# Patient Record
Sex: MALE | Race: WHITE | NOT HISPANIC OR LATINO | URBAN - NONMETROPOLITAN AREA
[De-identification: names, ages, dates, MRNs, and addresses within clinical notes are randomized per-mention and may not be internally consistent; named-entity substitution may affect disease eponyms.]

---

## 2018-07-05 PROBLEM — H35.3131: Noted: 2018-07-05

## 2018-07-05 PROBLEM — H02.834: Noted: 2018-07-05

## 2018-07-05 PROBLEM — H35.379: Noted: 2018-06-26

## 2018-07-05 PROBLEM — H43.813: Noted: 2018-06-26

## 2018-07-05 PROBLEM — H25.813: Noted: 2019-04-22

## 2018-07-05 PROBLEM — H25.813: Noted: 2018-06-26

## 2018-07-05 PROBLEM — H02.831: Noted: 2018-07-05

## 2018-07-05 PROBLEM — H35.379: Noted: 2019-04-22

## 2018-07-05 PROBLEM — H35.3131: Noted: 2019-04-22

## 2019-01-07 ENCOUNTER — IMPORTED ENCOUNTER (OUTPATIENT)
Dept: URBAN - NONMETROPOLITAN AREA CLINIC 1 | Facility: CLINIC | Age: 69
End: 2019-01-07

## 2019-01-07 PROCEDURE — 92014 COMPRE OPH EXAM EST PT 1/>: CPT

## 2019-01-07 PROCEDURE — 92134 CPTRZ OPH DX IMG PST SGM RTA: CPT

## 2019-01-07 NOTE — PATIENT DISCUSSION
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 -Recommend pt begin AREDS 2 MVT and monitoring Amsler grid.-Amsler grid given and explained to pt. Recommend monitoring daily. Pt is to contact our office if any changes are noted. Mild chance of vision loss. -OCT MAC ordered performed and reviewed with the patient. See scan for interpretation.-Order OCT MAC next visitMild ERM OUDiscussed with ptContinue to monitorAmlser Grid daily-OCT MAC ordered performed and reviewed with the patient. See scan for interpretation.-Order OCT MAC next visitCataract OU-Reviewed symptoms of advancing cataract growth such as glare and halos and decreased vision.-Continue to monitor for now. Pt will notify us if any new symptoms develop. Dermo L>RERM OS onlyOld OD cornea scar Central Vitreous floatersOURD Precautions RTC: 6mos f/u ARMD & OCT MAC

## 2019-04-22 ENCOUNTER — IMPORTED ENCOUNTER (OUTPATIENT)
Dept: URBAN - NONMETROPOLITAN AREA CLINIC 1 | Facility: CLINIC | Age: 69
End: 2019-04-22

## 2019-04-22 PROCEDURE — 92014 COMPRE OPH EXAM EST PT 1/>: CPT

## 2019-04-22 NOTE — PATIENT DISCUSSION
Cataract(s)-Visually significant cataract OU.-Cataract(s) causing symptomatic impairment of visual function not correctable with a tolerable change in glasses or contact lenses lighting or non-operative means resulting in specific activity limitations and/or participation restrictions including but not limited to reading viewing television driving or meeting vocational or recreational needs. -Expectation is clearer vision and functional improvement in symptoms as well as reduced glare disability after cataract removal.-Order IOLMaster and OPD today. -Recommend Toric IOL/LenSx OU based on today's OPD testing and lifestyle questionnaire.-Discussed LenSx in depth w/ patient.-All questions were answered regarding surgery including pre and post-op medications appointments activity restrictions and anesthetic usage.-The risks benefits and alternatives and special risk factors for the patient were discussed in detail including but not limited to: bleeding infection retinal detachment vitreous loss problems with the implant and possible need for additional surgery.-Although rare the possibility of complete vision loss was discussed.-The possible need for glasses post-operatively was discussed.-Order medical clearance exam based on history of -Patient elects to proceed with cataract surgery OD. Will schedule at patient's convenience and re-evaluate OS  in the futureERM OS>OD-Explained ERM w/ patient and advised that there are currently no treatments available. -Recommend pt begin AREDS 2 MVT and monitoring Amsler grid.-Amsler grid given and explained to pt. Recommend monitoring daily. Pt is to contact our office if any changes are noted.

## 2019-05-13 PROBLEM — H25.813: Noted: 2019-05-13

## 2019-05-13 PROBLEM — H35.3131: Noted: 2019-05-13

## 2019-05-13 PROBLEM — H35.379: Noted: 2019-05-13

## 2019-05-13 PROBLEM — H43.813: Noted: 2019-05-13

## 2019-05-21 ENCOUNTER — IMPORTED ENCOUNTER (OUTPATIENT)
Dept: URBAN - NONMETROPOLITAN AREA CLINIC 1 | Facility: CLINIC | Age: 69
End: 2019-05-21

## 2019-06-10 ENCOUNTER — IMPORTED ENCOUNTER (OUTPATIENT)
Dept: URBAN - NONMETROPOLITAN AREA CLINIC 1 | Facility: CLINIC | Age: 69
End: 2019-06-10

## 2019-06-10 PROBLEM — H02.831: Noted: 2019-06-10

## 2019-06-10 PROBLEM — H43.813: Noted: 2019-06-10

## 2019-06-10 PROBLEM — H02.834: Noted: 2019-06-10

## 2019-06-10 PROBLEM — H25.812: Noted: 2019-06-10

## 2019-06-10 PROBLEM — H35.379: Noted: 2019-06-10

## 2019-06-10 PROBLEM — H35.3131: Noted: 2019-06-10

## 2019-06-10 PROBLEM — Z98.41: Noted: 2019-06-10

## 2019-06-10 PROCEDURE — 66984 XCAPSL CTRC RMVL W/O ECP: CPT

## 2019-06-10 PROCEDURE — V2787 ASTIGMATISM-CORRECT FUNCTION: HCPCS

## 2019-06-10 PROCEDURE — 92136 OPHTHALMIC BIOMETRY: CPT

## 2019-06-10 NOTE — PATIENT DISCUSSION
s/p CE OD TORIC/TRAD 6/10/2019-  Pt doing well s/p PCIOL. -  Continue post-op gtts according to instruction sheet and sleep with eye shield over eye for 7 nights. -  Avoid bending at the waist lifting anything over 5lbs and dirty or cher environments.-  RTC 1 week as scheduled or prn

## 2019-06-17 ENCOUNTER — IMPORTED ENCOUNTER (OUTPATIENT)
Dept: URBAN - NONMETROPOLITAN AREA CLINIC 1 | Facility: CLINIC | Age: 69
End: 2019-06-17

## 2019-06-17 PROCEDURE — 99024 POSTOP FOLLOW-UP VISIT: CPT

## 2019-06-17 NOTE — PATIENT DISCUSSION
Cataract(s)-Visually significant cataract OS . -Cataract(s) causing symptomatic impairment of visual function not correctable with a tolerable change in glasses or contact lenses lighting or non-operative means resulting in specific activity limitations and/or participation restrictions including but not limited to reading viewing television driving or meeting vocational or recreational needs. -Expectation is clearer vision and functional improvement in symptoms as well as reduced glare disability after cataract removal.-Recommend Toric IOL   /   Limbal Relaxing Incisions based on previous OPD testing and lifestyle questionnaire.-All questions were answered regarding surgery including pre and post-op medications appointments activity restrictions and anesthetic usage.-The risks benefits and alternatives and special risk factors for the patient were discussed in detail including but not limited to: bleeding infection retinal detachment vitreous loss problems with the implant and possible need for additional surgery.-Although rare the possibility of complete vision loss was discussed.-The need for glasses post-operatively was discussed.-Patient elects to proceed with cataract surgery OS . Will schedule at patient's convenience. s/p PCIOL-Pt doing well at 1 week s/p PCIOL. -Continue post-op gtts according to instruction sheet.-Okay to resume usual activites and d/c eye shield.

## 2019-06-26 ENCOUNTER — IMPORTED ENCOUNTER (OUTPATIENT)
Dept: URBAN - NONMETROPOLITAN AREA CLINIC 1 | Facility: CLINIC | Age: 69
End: 2019-06-26

## 2019-06-26 PROBLEM — H43.813: Noted: 2019-06-10

## 2019-06-26 PROBLEM — H35.3131: Noted: 2019-06-10

## 2019-06-26 PROBLEM — H02.834: Noted: 2019-06-10

## 2019-06-26 PROBLEM — Z98.41: Noted: 2019-06-26

## 2019-06-26 PROBLEM — H35.379: Noted: 2019-06-10

## 2019-06-26 PROBLEM — H02.831: Noted: 2019-06-10

## 2019-06-26 PROBLEM — Z98.42: Noted: 2019-06-26

## 2019-06-26 PROCEDURE — V2787 ASTIGMATISM-CORRECT FUNCTION: HCPCS

## 2019-06-26 PROCEDURE — 66984 XCAPSL CTRC RMVL W/O ECP: CPT

## 2019-06-26 PROCEDURE — 92136 OPHTHALMIC BIOMETRY: CPT

## 2019-06-26 NOTE — PATIENT DISCUSSION
s/p PCIOL OS Toric/Trad 6/26/2019-  discussed findings w/patient-  Pt doing well s/p PCIOL. -  Continue post-op gtts according to instruction sheet and sleep with eye shield over eye for 7 nights. -  Avoid bending at the waist lifting anything over 5lbs and dirty or cher environments.-  RTC 1 week as scheduled

## 2019-07-03 ENCOUNTER — IMPORTED ENCOUNTER (OUTPATIENT)
Dept: URBAN - NONMETROPOLITAN AREA CLINIC 1 | Facility: CLINIC | Age: 69
End: 2019-07-03

## 2019-07-03 PROCEDURE — 99024 POSTOP FOLLOW-UP VISIT: CPT

## 2019-07-03 NOTE — PATIENT DISCUSSION
"s/p PCIOL OS Toric/Trad 6/26/2019-  discussed findings w/patient""-  Pt doing well at 1 week s/p PCIOL. -  Continue post-op gtts according to instruction sheet.-  Okay to resume usual activites and d/c eye shield. -  Monitor 3 month f/u Pseudophakia OU w/DFE"

## 2019-10-02 ENCOUNTER — IMPORTED ENCOUNTER (OUTPATIENT)
Dept: URBAN - NONMETROPOLITAN AREA CLINIC 1 | Facility: CLINIC | Age: 69
End: 2019-10-02

## 2019-10-02 PROBLEM — H43.813: Noted: 2019-10-02

## 2019-10-02 PROBLEM — H02.831: Noted: 2019-10-02

## 2019-10-02 PROBLEM — Z96.1: Noted: 2019-10-02

## 2019-10-02 PROBLEM — H02.834: Noted: 2019-10-02

## 2019-10-02 PROCEDURE — 99213 OFFICE O/P EST LOW 20 MIN: CPT

## 2019-10-02 NOTE — PATIENT DISCUSSION
Pseudophakia OU PCO OU:  -  discussed findings w/patient-  doing well happy with [facility]-  IOP in place well centered-  PCO noted but not visually significant at this time-  Monitor yearly or PRN PVD OU-  Discussed findings of exam in detail with the patient. -  The risk of retinal detachment in patients with PVDs was discussed with the patient and the warning signs of retinal detachment were carefully reviewed with the patient. -  The patient was warned to return to the office or contact the ophthalmologist on call immediately if they experience signs of retinal detachment or changes noted from today.  -  Continue to monitor PRN; 's Notes: MARIBELFE

## 2020-08-28 ENCOUNTER — IMPORTED ENCOUNTER (OUTPATIENT)
Dept: URBAN - NONMETROPOLITAN AREA CLINIC 1 | Facility: CLINIC | Age: 70
End: 2020-08-28

## 2020-08-28 PROBLEM — H02.831: Noted: 2020-08-28

## 2020-08-28 PROBLEM — H43.813: Noted: 2020-08-28

## 2020-08-28 PROBLEM — H26.493: Noted: 2020-08-28

## 2020-08-28 PROBLEM — H02.834: Noted: 2020-08-28

## 2020-08-28 PROBLEM — Z96.1: Noted: 2020-08-28

## 2020-08-28 PROBLEM — H35.362: Noted: 2020-08-28

## 2020-08-28 PROBLEM — H35.363: Noted: 2020-08-28

## 2020-08-28 PROCEDURE — 92014 COMPRE OPH EXAM EST PT 1/>: CPT

## 2020-08-28 NOTE — PATIENT DISCUSSION
s/p PCIOL-Stable PCIOL OU w/PCO OU-Explained symptoms of advancing PCO. -Continue to monitor for now. Pt will notify us if any new symptoms develop. Drusen OSDiscussed in detail w/pt Recommend continue MultivitaminContinue to monitor; 's Notes: MRDFE

## 2022-04-09 ASSESSMENT — VISUAL ACUITY
OD_CC: 20/25
OD_CC: 20/80+
OS_CC: J1+
OS_CC: 20/100
OD_CC: 20/25
OS_CC: J1
OD_GLARE: 20/50
OD_CC: 20/25-1
OD_SC: 20/20
OS_AM: 20/20
OD_PAM: 20/20
OS_AM: 20/20
OD_CC: 20/25+
OS_CC: 20/20
OS_CC: 20/20
OS_CC: 20/25
OS_PH: 20/30
OS_CC: 20/40
OD_CC: J1+
OU_SC: 20/20
OD_GLARE: 20/50
OD_PH: 20/40
OS_SC: 20/25
OS_SC: 20/20
OS_GLARE: 20/50
OD_CC: 20/25
OS_CC: 20/80
OS_GLARE: 20/50
OS_GLARE: 20/70
OD_SC: 20/20-1

## 2022-04-09 ASSESSMENT — TONOMETRY
OD_IOP_MMHG: 11
OS_IOP_MMHG: 12
OS_IOP_MMHG: 14
OS_IOP_MMHG: 10
OD_IOP_MMHG: 11
OS_IOP_MMHG: 14
OD_IOP_MMHG: 10
OD_IOP_MMHG: 06
OD_IOP_MMHG: 18
OD_IOP_MMHG: 10
OS_IOP_MMHG: 13
OD_IOP_MMHG: 10
OD_IOP_MMHG: 10
OS_IOP_MMHG: 12
OS_IOP_MMHG: 22

## 2023-02-01 RX ORDER — FUROSEMIDE 20 MG/1
20 TABLET ORAL DAILY
COMMUNITY

## 2023-02-01 RX ORDER — ATORVASTATIN CALCIUM 10 MG/1
TABLET, FILM COATED ORAL DAILY
COMMUNITY

## 2023-02-01 RX ORDER — DILTIAZEM HYDROCHLORIDE 180 MG/1
180 CAPSULE, EXTENDED RELEASE ORAL DAILY
COMMUNITY

## 2024-08-06 ENCOUNTER — COMPREHENSIVE EXAM (OUTPATIENT)
Dept: URBAN - NONMETROPOLITAN AREA CLINIC 4 | Facility: CLINIC | Age: 74
End: 2024-08-06

## 2024-08-06 DIAGNOSIS — H35.362: ICD-10-CM

## 2024-08-06 DIAGNOSIS — H43.813: ICD-10-CM

## 2024-08-06 DIAGNOSIS — H26.493: ICD-10-CM

## 2024-08-06 DIAGNOSIS — Z96.1: ICD-10-CM

## 2024-08-06 PROCEDURE — 92014 COMPRE OPH EXAM EST PT 1/>: CPT

## 2024-08-06 ASSESSMENT — VISUAL ACUITY
OD_BAT: 20/40
OS_SC: 20/25+2
OD_SC: 20/25
OS_BAT: 20/40-1

## 2024-08-06 ASSESSMENT — TONOMETRY
OS_IOP_MMHG: 14
OD_IOP_MMHG: 12

## 2025-08-07 ENCOUNTER — COMPREHENSIVE EXAM (OUTPATIENT)
Age: 75
End: 2025-08-07

## 2025-08-07 DIAGNOSIS — H26.493: ICD-10-CM

## 2025-08-07 DIAGNOSIS — H52.4: ICD-10-CM

## 2025-08-07 DIAGNOSIS — H35.363: ICD-10-CM

## 2025-08-07 DIAGNOSIS — Z96.1: ICD-10-CM

## 2025-08-07 DIAGNOSIS — H43.813: ICD-10-CM

## 2025-08-07 PROCEDURE — 99214 OFFICE O/P EST MOD 30 MIN: CPT

## 2025-08-07 PROCEDURE — 92015 DETERMINE REFRACTIVE STATE: CPT
